# Patient Record
Sex: MALE | Race: WHITE
[De-identification: names, ages, dates, MRNs, and addresses within clinical notes are randomized per-mention and may not be internally consistent; named-entity substitution may affect disease eponyms.]

---

## 2018-08-10 ENCOUNTER — HOSPITAL ENCOUNTER (OUTPATIENT)
Dept: HOSPITAL 45 - C.RAD | Age: 23
Discharge: HOME | End: 2018-08-10
Attending: PHYSICIAN ASSISTANT
Payer: COMMERCIAL

## 2018-08-10 DIAGNOSIS — M79.671: Primary | ICD-10-CM
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
## 2018-08-10 DIAGNOSIS — S92.351A: ICD-10-CM

## 2018-08-10 NOTE — DIAGNOSTIC IMAGING REPORT
R FOOT MIN 3 VIEWS ROUTINE



CLINICAL HISTORY: 23 years-old Male presenting with M79.671 pain along the

lateral aspect of the foot. 



TECHNIQUE: Frontal, oblique, lateral views of the right foot were obtained. 



COMPARISON: None.



FINDINGS:

Mildly diastatic transversely oriented fracture of the base of the fifth

metatarsal. This appears to be extra-articular consistent with an avulsion

injury. No other fracture. No advanced degenerative change. Mild soft tissue

swelling along the lateral aspect of the foot.



IMPRESSION:

Extra-articular avulsion-type fracture of the base of the fifth metatarsal with

mild diastases at the fracture plane.







Electronically signed by:  Fred Vera M.D.

8/10/2018 2:27 PM



Dictated Date/Time:  8/10/2018 2:26 PM